# Patient Record
Sex: FEMALE | URBAN - METROPOLITAN AREA
[De-identification: names, ages, dates, MRNs, and addresses within clinical notes are randomized per-mention and may not be internally consistent; named-entity substitution may affect disease eponyms.]

---

## 2020-07-14 ENCOUNTER — NURSE TRIAGE (OUTPATIENT)
Dept: NURSING | Facility: CLINIC | Age: 80
End: 2020-07-14

## 2020-07-14 NOTE — TELEPHONE ENCOUNTER
Gucci (daughter) calls and says that her mother has had a headache for 3 days. Because mother was not with caller,caller connected mother to the call, so this nurse could speak to mother. Mother says that her headache is moderate and that her blood pressure medication is at a pharmacy that is closed. Pt. Says that she sees Dr. Gaytan, at the [facility] clinic. RN saw nothing in pt's chart and no medications listed. Daughter says that her mother takes Losartan 100 mg po every day in the am. Dr. Peguero-[facility]-was paged, to call this nurse, at [phone] at [phone] via page . Daughter says that her mother does not want to see a  About this. COVID 19 Nurse Triage Plan/Patient Instructions    Please be aware that novel coronavirus (COVID-19) may be circulating in the community. If you develop symptoms such as fever, cough, or SOB or if you have concerns about the presence of another infection including coronavirus (COVID-19), please contact your health care provider or visit [email or web address].     Disposition/Instructions    In-Person Visit with provider recommended. Reference Visit Selection Guide.    Thank you for taking steps to prevent the spread of this virus.  o Limit your contact with others.  o Wear a simple mask to cover your cough.  o Wash your hands well and often.    Resources    [facility]: About COVID-19: [email or web address]    CDC: What to Do If You're Sick: www.cdc.gov/coronavirus/2019-ncov/about/steps-when-sick.html    CDC: Ending Home Isolation: www.cdc.gov/coronavirus/2019-ncov/hcp/disposition-in-home-patients.html     CDC: Caring for Someone: www.cdc.gov/coronavirus/2019-ncov/if-you-are-sick/care-for-someone.html     [facility]: Interim Guidance for Hospital Discharge to Home: www.health.[facility].[place]/diseases/coronavirus/hcp/hospdischarge.pdf    [facility] clinical trials (COVID-19 research studies): clinicalaffairs.[facility]-clinical-trials     Below are the  "COVID-19 hotlines at the [place] [facility] ([facility]). Interpreters are available.   o For health questions: Call 255-340-7442 or [phone] (7 a.m. to 7 p.m.)  o For questions about schools and childcare: Call 196-226-0558 or 1-535.325.4816 (7 a.m. to 7 p.m.)                     Additional Information    Negative: Difficult to awaken or acting confused (e.g., disoriented, slurred speech)    Negative: [1] Weakness of the face, arm or leg on one side of the body AND [2] new onset    Negative: [1] Numbness of the face, arm or leg on one side of the body AND [2] new onset    Negative: [1] Loss of speech or garbled speech AND [2] new onset    Negative: Passed out (i.e., lost consciousness, collapsed and was not responding)    Negative: Sounds like a life-threatening emergency to the triager    Negative: Followed a head injury    Negative: Pregnant    Negative: Postpartum (from 0 to 6 weeks after delivery)    Negative: Traumatic Brain Injury (TBI) is suspected    Negative: Unable to walk, or can only walk with assistance (e.g., requires support)    Negative: Stiff neck (can't touch chin to chest)    Negative: Severe pain in one eye    Negative: [1] Other family members (or roommates) with headaches AND [2] possibility of carbon monoxide exposure    Negative: [1] SEVERE headache (e.g., excruciating) AND [2] \"worst headache\" of life    Negative: [1] SEVERE headache AND [2] sudden-onset (i.e., reaching maximum intensity within seconds)    Negative: [1] SEVERE headache AND [2] fever    Negative: Loss of vision or double vision (Exception: same as prior migraines)    Negative: [1] Fever > 100.0 F (37.8 C) AND [2] diabetes mellitus or weak immune system (e.g., HIV positive, cancer chemo, splenectomy, organ transplant, chronic steroids)    Negative: Patient sounds very sick or weak to the triager    Negative: [1] SEVERE headache (e.g., excruciating) AND [2] not improved after 2 hours of pain medicine    Negative: " [1] Vomiting AND [2] 2 or more times (Exception: similar to previous migraines)    Negative: Fever > 104 F (40 C)    [1] MODERATE headache (e.g., interferes with normal activities) AND [2] present > 24 hours AND [3] unexplained  (Exceptions: analgesics not tried, typical migraine, or headache part of viral illness)    Protocols used: HEADACHE-A-AH

## 2020-07-15 NOTE — TELEPHONE ENCOUNTER
Addendum to previous call: Dr. Peguero called this nurse back and was told about pt's headache and about pt's need for Losartan 100 mg. Dr. Peguero then E-Prescribed the medication to the [facility] in [place], as daughter requested. RN then called daughter back and told her about the medication being ordered. Daughter voiced understanding.  Additional Information    Negative: [1] Caller is not with the adult (patient) AND [2] reporting urgent symptoms    Negative: Lab result questions    Negative: Medication questions    Negative: Caller can't be reached by phone    Negative: Caller has already spoken to PCP or another triager    Negative: RN needs further essential information from caller in order to complete triage    Negative: Requesting regular office appointment    Negative: [1] Caller requesting NON-URGENT health information AND [2] PCP's office is the best resource    Negative: Health Information question, no triage required and triager able to answer question    Negative: General information question, no triage required and triager able to answer question    Negative: Question about upcoming scheduled test, no triage required and triager able to answer question    Negative: [1] Caller is not with the adult (patient) AND [2] probable NON-URGENT symptoms    [1] Follow-up call to recent contact AND [2] information only call, no triage required    Protocols used: INFORMATION ONLY CALL-A-